# Patient Record
(demographics unavailable — no encounter records)

---

## 2025-01-02 NOTE — HISTORY OF PRESENT ILLNESS
[de-identified] : f/u of rt hip also reports left knee pain notes some swelling in the right leg, some numbness around incision site, and some pain mainly around right thigh no hip or groin pain  Imaging: X-rays were reviewed with the patient.   AP and Lateral views were obtained of the hips, including the contralateral side for comparison purposes. X-rays are negative for acute bone or soft tissue trauma. right modular hip replacement in good position  Imaging: X-rays were taken in the office today.   AP and Lateral views were obtained of the right femur X-rays are negative for acute bone or soft tissue trauma. hip implant in good position, no signs of loosening. moderate right knee oa.  impression s/p conversion right maura plan: home exercises f/u in 3 mnths.

## 2025-03-28 NOTE — HISTORY OF PRESENT ILLNESS
[de-identified] : Patient is a 65-year-old female here for reevaluation of left knee and for Euflexxa series injection.  Left knee exam: No effusion no ecchymosis no erythema joint line tenderness range of motion 0 degrees to 110 degrees with discomfort patella crepitus no gross instability neurovascular intact calf soft nontender  Reviewed prior left knee x-rays from chart showing moderate osteoarthritis  An injection of Euflexxa 2mL was injected into LT knee after verbal consent using sterile technique. The risks, benefits, and alternatives to Visco-supplementation injection were explained in full to the patient. Risks outlined include but are not limited to infection, sepsis, bleeding, scarring, skin discoloration, temporary increase in pain, syncopal episode, failure to resolve symptoms, allergic reaction, and symptom recurrence. Signs and symptoms of infection reviewed, and patients advised to call immediately for redness, fevers, and/or chills. Patient understood the risks. All questions were answered. Sterile technique was used without complications. The patient tolerated the procedure well. Ice tonight to the injection site.        We will follow-up in 1 week for second injection.  Call if any questions or concerns.  Patient understands agrees with plan.

## 2025-04-07 NOTE — PROCEDURE
[Large Joint Injection] : Large joint injection [Left] : of the left [Knee] : knee [Pain] : pain [X-ray evidence of Osteoarthritis on this or prior visit] : x-ray evidence of Osteoarthritis on this or prior visit [Alcohol] : alcohol [Betadine] : betadine [Ethyl Chloride sprayed topically] : ethyl chloride sprayed topically [Sterile technique used] : sterile technique used [Euflexxa(20mg)] : 20mg of Euflexxa [#2] : series #2 [] : Patient tolerated procedure well [Apply ice for 15min out of every hour for the next 12-24 hours as tolerated] : apply ice for 15 minutes out of every hour for the next 12-24 hours as tolerated [Patient was advised to rest the joint(s) for ____ days] : patient was advised to rest the joint(s) for [unfilled] days

## 2025-04-07 NOTE — DISCUSSION/SUMMARY
[de-identified] : IMPRESSION: Left knee Osteoarthritis   PLAN: Euflexxa #2 given to Left knee   FOLLOW-UP: 1 week

## 2025-04-15 NOTE — HISTORY OF PRESENT ILLNESS
[de-identified] : Patient is a 65-year-old female here for reevaluation of left knee and for Euflexxa series injection.  Left knee exam: No effusion no ecchymosis no erythema joint line tenderness range of motion 0 degrees to 110 degrees with discomfort patella crepitus no gross instability neurovascular intact calf soft nontender  Reviewed prior left knee x-rays from chart showing moderate osteoarthritis  An injection of Euflexxa 2mL was injected into LT knee after verbal consent using sterile technique. The risks, benefits, and alternatives to Visco-supplementation injection were explained in full to the patient. Risks outlined include but are not limited to infection, sepsis, bleeding, scarring, skin discoloration, temporary increase in pain, syncopal episode, failure to resolve symptoms, allergic reaction, and symptom recurrence. Signs and symptoms of infection reviewed, and patients advised to call immediately for redness, fevers, and/or chills. Patient understood the risks. All questions were answered. Sterile technique was used without complications. The patient tolerated the procedure well. Ice tonight to the injection site.        We will follow-up in 4-6 months   Call if any questions or concerns.  Patient understands agrees with plan.

## 2025-04-17 NOTE — REASON FOR VISIT
[FreeTextEntry2] : 65-year-old female, status post conversion right total hip replacement here for 3 month follow up